# Patient Record
Sex: MALE | Race: WHITE | NOT HISPANIC OR LATINO | Employment: STUDENT | URBAN - METROPOLITAN AREA
[De-identification: names, ages, dates, MRNs, and addresses within clinical notes are randomized per-mention and may not be internally consistent; named-entity substitution may affect disease eponyms.]

---
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
## 2023-01-23 NOTE — DISCHARGE INSTRUCTIONS
[address] [facility], MD, FACP, Cite Deneen Barfield, Lashell LarsonANNEMARIE Jung, PCNP-BC   Jennifer Marinelli, [facility]-   Deysi Simpson, FNP-C  Romina Davis FNP-C   Allison Manzano, AGPCNP-BC      [address]   at [address], [address] [facility]  22.   [phone]   FAX: [address]   at [address], [address] [place] [address] - [address]   [phone]   FAX: [phone]         UPPER ENDOSCOPY PROCEDURE NOTE    Dorothy Michelet  [birth year]    INDICATION: Cirrhosis. Screening for esophageal varices with variceal ligation if  indicated. : Nila Canas MD    SURGICAL ASSISTANT:  None    PROSTHETIC DEVISES, TISSUE GRAFTS, ORGAN TRANSPLANTS:  Not applicable     ANESTHESIA/SEDATION: MAC Sedation per anesthesiology          PROCEDURE DESCRIPTION:  Inf[employer] consent was obtained from the patient for the procedure. All risks and benefits of the procedure explained. The procedure was performed in the endoscopy suite. The patient was laying on a stretcher and moved to the left lateral decubitus position prior to administration of sedation. Sedation was administered by anesthesiology. See their note for details. The endoscope was inserted into the mouth and advanced under direct vision to the second portion of the duodenum. Careful inspection of upper gastrointestinal tract was made as the endoscope was inserted and withdrawn. Retroflexion of the endoscope to view of the cardia of the stomach was performed. The findings and interventions are described below. FINDINGS:  Esophagus:    Normal.      Stomach:   No portal hypertensive gastropathy   GAVE that was not oozing  No gastric varices identified.     Duodenum:   Normal bulb and second Ice to area of soreness  Tylenol or Motrin if needed for pain  Follow up with your doctor     Contusion in [address] [address]:   A contusion is a bruise that appears on your child's skin after an injury  A bruise happens when small blood vessels tear but skin does not  When blood vessels tear, blood leaks into nearby tissue, such as soft tissue or muscle  DISCHARGE INSTRUCTIONS:   Return to the emergency department if:   · Your child cannot feel or move his or her injured arm or leg  · Your child begins to complain of pressure or a tight feeling in his or her injured muscle  · Your child suddenly has more pain when he or she moves the injured area  · Your child has severe pain in the area of the bruise  · Your child's hand or foot below the bruise gets cold or turns pale  Contact your child's healthcare provider if:   · The injured area is red and warm to the touch  · Your child's symptoms do not improve after 4 to 5 days of treatment  · You have questions or concerns about your child's condition or care  Medicines:   · NSAIDs , such as ibuprofen, help decrease swelling, pain, and fever  This medicine is available with or without a doctor's order  NSAIDs can cause stomach bleeding or kidney problems in certain people  If your child takes blood thinner medicine, always ask if NSAIDs are safe for him  Always read the medicine label and follow directions  Do not give these medicines to children under 10months of age without direction from your child's healthcare provider  · Prescription pain medicine  may be given  Do not wait until the pain is severe before you give your child more medicine  · Do not give aspirin to children under 25years of age  Your child could develop Reye syndrome if he takes aspirin  Reye syndrome can cause life-threatening brain and liver damage  Check your child's medicine labels for aspirin, salicylates, or oil of wintergreen       · Give your child's medicine as directed  Contact your child's healthcare provider if you think the medicine is not working as expected  Tell him or her if your child is allergic to any medicine  Keep a current list of the medicines, vitamins, and herbs your child takes  Include the amounts, and when, how, and why they are taken  Bring the list or the medicines in their containers to follow-up visits  Carry your child's medicine list with you in case of an emergency  Follow up with your child's healthcare provider as directed:  Write down your questions so you remember to ask them during your child's visits  Help your child's contusion heal:   · Have your child rest the injured area  or use it less than usual  If your child bruised a leg or foot, crutches may be needed to help your child walk  This will help your child keep weight off the injured body part  · Apply ice  to decrease swelling and pain  Ice may also help prevent tissue damage  Use an ice pack, or put crushed ice in a plastic bag  Cover it with a towel and place it on your child's bruise for 15 to 20 minutes every hour or as directed  · Use compression  to support the area and decrease swelling  Wrap an elastic bandage around the area over the bruised muscle  Make sure the bandage is not too tight  You should be able to fit 1 finger between the bandage and your skin  · Elevate (raise) your child's injured body part  above the level of his or her heart to help decrease pain and swelling  Use pillows, blankets, or rolled towels to elevate the area as often as you can  · Do not let your child stretch injured muscles  right after the injury  Ask your child's healthcare provider when and how your child may safely stretch after the injury  Gentle stretches can help increase your child's flexibility  · Do not massage the area or put heating pads  on the bruise right after the injury  Heat and massage may slow healing   Your child's healthcare provider portion    SPECIMENS COLLECTED:   None    INTERVENTIONS:  None    COMPLICATIONS: None. The patient tolerated the procedure well. EBL: Negligible. RECOMMENDATIONS:  Observe until discharge parameters are achieved. May resume previous diet. Repeat endoscopy to reassess varices and need for banding in 1 year. Follow-up [facility] office as scheduled.       MD Arlen Ewing [address], [address] [place] [address] Street - [address] [phone]  FAX: [address] [place] [address] may tell you to apply heat after several days  At that time, heat will start to help the injury heal   Prevent contusions:   · Do not leave your baby alone on the bed or couch  Watch him or her closely as he or she starts to crawl, learns to walk, and plays  · Make sure your child wears proper protective gear  These include padding and protective gear such as shin guards  He or she should wear these when he or she plays sports  Teach your child about safe equipment and places to play, and teach him or her to follow safety rules  · Remove or cover sharp objects in your home  As a very young child learns to walk, he or she is more likely to get injured on corners of furniture  Remove these items, or place soft pads over sharp edges and hard items in your home  © 2017 [address] Information is for End User's use only and may not be sold, redistributed or otherwise used for commercial purposes  All illustrations and images included in CareNotes® are the copyrighted property of [employer] , [employer]  or Joel Munroe  The above information is an  only  It is not intended as medical advice for individual conditions or treatments  Talk to your doctor, nurse or pharmacist before following any medical regimen to see if it is safe and effective for you

## 2023-02-27 ENCOUNTER — APPOINTMENT (OUTPATIENT)
Dept: URGENT CARE | Facility: CLINIC | Age: 18
End: 2023-02-27

## 2023-08-10 ENCOUNTER — HOSPITAL ENCOUNTER (EMERGENCY)
Facility: HOSPITAL | Age: 18
Discharge: HOME | End: 2023-08-10
Attending: EMERGENCY MEDICINE
Payer: MEDICARE

## 2023-08-10 VITALS
DIASTOLIC BLOOD PRESSURE: 79 MMHG | OXYGEN SATURATION: 97 % | HEART RATE: 66 BPM | RESPIRATION RATE: 18 BRPM | TEMPERATURE: 99.1 F | SYSTOLIC BLOOD PRESSURE: 138 MMHG

## 2023-08-10 DIAGNOSIS — B34.9 VIRAL SYNDROME: Primary | ICD-10-CM

## 2023-08-10 LAB
FLUAV RNA RESP QL NAA+PROBE: NEGATIVE
FLUBV RNA RESP QL NAA+PROBE: NEGATIVE
RSV RNA RESP QL NAA+PROBE: NEGATIVE
SARS-COV-2 RNA RESP QL NAA+PROBE: NEGATIVE

## 2023-08-10 PROCEDURE — 96372 THER/PROPH/DIAG INJ SC/IM: CPT

## 2023-08-10 PROCEDURE — 99283 EMERGENCY DEPT VISIT LOW MDM: CPT

## 2023-08-10 PROCEDURE — 0241U HB NFCT DS VIR RESP RNA 4 TRGT: CPT | Performed by: EMERGENCY MEDICINE

## 2023-08-10 PROCEDURE — 99284 EMERGENCY DEPT VISIT MOD MDM: CPT | Performed by: EMERGENCY MEDICINE

## 2023-08-10 PROCEDURE — NC001 PR NO CHARGE: Performed by: EMERGENCY MEDICINE

## 2023-08-10 RX ORDER — KETOROLAC TROMETHAMINE 30 MG/ML
15 INJECTION, SOLUTION INTRAMUSCULAR; INTRAVENOUS ONCE
Status: COMPLETED | OUTPATIENT
Start: 2023-08-10 | End: 2023-08-10

## 2023-08-10 RX ADMIN — KETOROLAC TROMETHAMINE 15 MG: 30 INJECTION, SOLUTION INTRAMUSCULAR; INTRAVENOUS at 10:48

## 2023-08-10 NOTE — ED PROVIDER NOTES
History  Chief Complaint   Patient presents with   • Flu Symptoms     Pt reports not feeling well for the past week, body aches, SOB, unable to eat, congestion, runny nose. Pt c/o of Left ankle swelling. Has had sick contacts     20 y/o male with flu like symptoms x 4 days. Patient states that he has had cough, congestion, sore throat, headache, body aches, and nausea/vomiting over the last 4 days. Patient states that he was around another person with similar symptoms. He denies any measured fevers at home. Denies any diarrhea or abdominal pain. He has tried multiple over-the-counter medications with minimal relief. No other complaints. History provided by:  Patient  Flu Symptoms  Presenting symptoms: cough, headache, myalgias, nausea, sore throat and vomiting    Presenting symptoms: no diarrhea, no fever and no shortness of breath    Severity:  Moderate  Onset quality:  Sudden  Progression:  Worsening  Chronicity:  New  Relieved by:  None tried  Worsened by:  Nothing  Ineffective treatments:  None tried  Associated symptoms: nasal congestion    Associated symptoms: no chills, no ear pain and no neck stiffness    Risk factors: sick contacts        Prior to Admission Medications   Prescriptions Last Dose Informant Patient Reported? Taking?   montelukast (SINGULAIR) 10 mg tablet  Self No No   Sig: Take 1 tablet (10 mg total) by mouth daily at bedtime      Facility-Administered Medications: None       Past Medical History:   Diagnosis Date   • Asthma     childhood       Past Surgical History:   Procedure Laterality Date   • CIRCUMCISION     • HERNIA REPAIR Right     Prp I/dl init reduc >5 yr   • TONSILLECTOMY     • TONSILLECTOMY AND ADENOIDECTOMY     • TYMPANOSTOMY TUBE PLACEMENT         Family History   Adopted: Yes   Problem Relation Age of Onset   • No Known Problems Mother    • No Known Problems Father      I have reviewed and agree with the history as documented.     E-Cigarette/Vaping   • E-Cigarette Use Never User      E-Cigarette/Vaping Substances   • Nicotine No    • CBD No    • Flavoring No    • Other No    • Unknown No      Social History     Tobacco Use   • Smoking status: Never   • Smokeless tobacco: Never   Vaping Use   • Vaping Use: Never used   Substance Use Topics   • Alcohol use: Never   • Drug use: Never       Review of Systems   Constitutional: Negative for chills and fever. HENT: Positive for congestion and sore throat. Negative for ear pain. Eyes: Negative for pain and visual disturbance. Respiratory: Positive for cough. Negative for shortness of breath and wheezing. Cardiovascular: Negative for chest pain and leg swelling. Gastrointestinal: Positive for nausea and vomiting. Negative for abdominal pain and diarrhea. Genitourinary: Negative for dysuria, frequency, hematuria and urgency. Musculoskeletal: Positive for myalgias. Negative for neck pain and neck stiffness. Skin: Negative for rash and wound. Neurological: Positive for headaches. Negative for weakness and numbness. Psychiatric/Behavioral: Negative for agitation and confusion. All other systems reviewed and are negative. Physical Exam  Physical Exam  Vitals and nursing note reviewed. Constitutional:       Appearance: He is well-developed. HENT:      Head: Normocephalic and atraumatic. Eyes:      Pupils: Pupils are equal, round, and reactive to light. Cardiovascular:      Rate and Rhythm: Normal rate and regular rhythm. Pulmonary:      Effort: Pulmonary effort is normal.      Breath sounds: Normal breath sounds. Abdominal:      General: Bowel sounds are normal.      Palpations: Abdomen is soft. Musculoskeletal:         General: Normal range of motion. Cervical back: Normal range of motion and neck supple. Skin:     General: Skin is warm and dry. Neurological:      General: No focal deficit present. Mental Status: He is alert and oriented to person, place, and time.       Comments: No focal deficits         Vital Signs  ED Triage Vitals [08/10/23 1004]   Temperature Pulse Respirations Blood Pressure SpO2   99.1 °F (37.3 °C) 86 18 (!) 174/110 97 %      Temp Source Heart Rate Source Patient Position - Orthostatic VS BP Location FiO2 (%)   Temporal Monitor Sitting Right arm --      Pain Score       --           Vitals:    08/10/23 1004   BP: (!) 174/110   Pulse: 86   Patient Position - Orthostatic VS: Sitting         Visual Acuity      ED Medications  Medications   ketorolac (TORADOL) injection 15 mg (15 mg Intramuscular Given 8/10/23 1048)       Diagnostic Studies  Results Reviewed     Procedure Component Value Units Date/Time    FLU/RSV/COVID - if FLU/RSV clinically relevant [[id number]]  (Normal) Collected: 08/10/23 1048    Lab Status: Final result Specimen: Nares from Nose Updated: 08/10/23 1136     SARS-CoV-2 Negative     INFLUENZA A PCR Negative     INFLUENZA B PCR Negative     RSV PCR Negative    Narrative:      FOR PEDIATRIC PATIENTS - copy/paste COVID Guidelines URL to browser: [email or web address]    SARS-CoV-2 assay is a Nucleic Acid Amplification assay intended for the  qualitative detection of nucleic acid from SARS-CoV-2 in nasopharyngeal  swabs. Results are for the presumptive identification of SARS-CoV-2 RNA. Positive results are indicative of infection with SARS-CoV-2, the virus  causing COVID-19, but do not rule out bacterial infection or co-infection  with other viruses. Laboratories within the [facility] and its  territories are required to report all positive results to the appropriate  public health authorities. Negative results do not preclude SARS-CoV-2  infection and should not be used as the sole basis for treatment or other  patient management decisions. Negative results must be combined with  clinical observations, patient history, and epidemiological information.   This test has not been FDA cleared or approved. This test has been authorized by FDA under an Emergency Use Authorization  (EUA). This test is only authorized for the duration of time the  declaration that circumstances exist justifying the authorization of the  emergency use of an in vitro diagnostic tests for detection of SARS-CoV-2  virus and/or diagnosis of COVID-19 infection under section 564(b)(1) of  the Act, 21 U. S.C. 453CSW-9(P)(4), unless the authorization is terminated  or revoked sooner. The test has been validated but independent review by FDA  and CLIA is pending. Test performed using [employer] GeneXpert: This RT-PCR assay targets N2,  a region unique to SARS-CoV-2. A conserved region in the E-gene was chosen  for pan-Sarbecovirus detection which includes SARS-CoV-2. According to CMS-2020-01-R, this platform meets the definition of high-throughput technology. No orders to display              Procedures  Procedures         ED Course         CRAFFT    Flowsheet Row Most Recent Value   CRAFFT Initial Screen: During the past 12 months, did you:    1. Drink any alcohol (more than a few sips)? No Filed at: 08/10/2023 1017   2. Smoke any marijuana or hashish No Filed at: 08/10/2023 1017   3. Use anything else to get high? ("anything else" includes illegal drugs, over the counter and prescription drugs, and things that you sniff or 'mckinney')? No Filed at: 08/10/2023 1017                                          Medical Decision Making  20-year-old male here for flulike symptoms-will get COVID testing and give Toradol for symptom relief. COVID test negative-patient likely with viral syndrome. Will instruct supportive care at home. Viral syndrome: acute illness or injury  Risk  Prescription drug management.           Disposition  Final diagnoses:   Viral syndrome     Time reflects when diagnosis was documented in both MDM as applicable and the Disposition within this note     Time User Action Codes Description Comment 8/10/2023 12:20 PM West ORTIZ Add [B34.9] Viral syndrome       ED Disposition     ED Disposition   Discharge    Condition   Stable    Date/Time   Thu Aug 10, 2023 10:59 AM    Comment   Huang Arana discharge to home/self care. Follow-up Information     Follow up With Specialties Details Why Contact Info Additional Information    Stacy Almazan MD Family Medicine Call in 1 day For follow-up within 2 to 3 days [address] Emergency Department Emergency Medicine Go to  Immediately for any new or worsening symptoms [address] Emergency Department, [place], [place], [address]          Patient's Medications   Discharge Prescriptions    No medications on file       No discharge procedures on file.     PDMP Review     None          ED Provider  Electronically Signed by           West Delacruz DO  08/10/23 3682

## 2024-01-09 ENCOUNTER — HOSPITAL ENCOUNTER (EMERGENCY)
Facility: HOSPITAL | Age: 19
Discharge: HOME | End: 2024-01-09
Attending: EMERGENCY MEDICINE
Payer: OTHER MISCELLANEOUS

## 2024-01-09 ENCOUNTER — APPOINTMENT (EMERGENCY)
Dept: RADIOLOGY | Facility: HOSPITAL | Age: 19
End: 2024-01-09
Payer: OTHER MISCELLANEOUS

## 2024-01-09 VITALS
HEART RATE: 84 BPM | RESPIRATION RATE: 18 BRPM | OXYGEN SATURATION: 98 % | TEMPERATURE: 97.7 F | SYSTOLIC BLOOD PRESSURE: 140 MMHG | DIASTOLIC BLOOD PRESSURE: 83 MMHG

## 2024-01-09 DIAGNOSIS — S89.91XA RIGHT KNEE INJURY, INITIAL ENCOUNTER: Primary | ICD-10-CM

## 2024-01-09 PROCEDURE — 73564 X-RAY EXAM KNEE 4 OR MORE: CPT

## 2024-01-09 PROCEDURE — 99283 EMERGENCY DEPT VISIT LOW MDM: CPT

## 2024-01-09 PROCEDURE — 99284 EMERGENCY DEPT VISIT MOD MDM: CPT | Performed by: PHYSICIAN ASSISTANT

## 2024-01-09 NOTE — ED PROVIDER NOTES
"History  Chief Complaint   Patient presents with    Knee Injury     Patient c/o right knee injury that occurred at work when he slipped and fell and landed on his right knee.  No head injury. No loc     17yo male with no significant past medical history presenting for evaluation after a right knee injury that occurred about 4 hours ago. He was at work today when he slipped and fell on his right side. There was no head injury or LOC. He started having medial right knee pain after getting up from the fall. He is able to bear weight. He denies any paresthesias. He reports being diagnosed with \"runner's knee\" in the past.       History provided by:  Patient   used: No        Prior to Admission Medications   Prescriptions Last Dose Informant Patient Reported? Taking?   montelukast (SINGULAIR) 10 mg tablet  Self No No   Sig: Take 1 tablet (10 mg total) by mouth daily at bedtime      Facility-Administered Medications: None       Past Medical History:   Diagnosis Date    Asthma     childhood       Past Surgical History:   Procedure Laterality Date    CIRCUMCISION      HERNIA REPAIR Right     Prp I/dl init reduc >5 yr    TONSILLECTOMY      TONSILLECTOMY AND ADENOIDECTOMY      TYMPANOSTOMY TUBE PLACEMENT         Family History   Adopted: Yes   Problem Relation Age of Onset    No Known Problems Mother     No Known Problems Father      I have reviewed and agree with the history as documented.    E-Cigarette/Vaping    E-Cigarette Use Never User      E-Cigarette/Vaping Substances    Nicotine No     CBD No     Flavoring No     Other No     Unknown No      Social History     Tobacco Use    Smoking status: Never    Smokeless tobacco: Never   Vaping Use    Vaping status: Never Used   Substance Use Topics    Alcohol use: Never    Drug use: Never       Review of Systems   Constitutional:  Negative for chills and fever.   HENT:  Negative for drooling and voice change.    Eyes:  Negative for discharge and redness. "   Respiratory:  Negative for shortness of breath and stridor.    Cardiovascular:  Negative for chest pain and leg swelling.   Gastrointestinal:  Negative for abdominal pain and vomiting.   Musculoskeletal:  Positive for arthralgias. Negative for joint swelling, neck pain and neck stiffness.   Skin:  Negative for color change and rash.   Neurological:  Negative for seizures and syncope.   Psychiatric/Behavioral:  Negative for confusion. The patient is not nervous/anxious.    All other systems reviewed and are negative.      Physical Exam  Physical Exam  Vitals and nursing note reviewed.   Constitutional:       General: He is not in acute distress.     Appearance: Normal appearance. He is not toxic-appearing.   HENT:      Head: Normocephalic and atraumatic.      Right Ear: External ear normal.      Left Ear: External ear normal.   Eyes:      General: No scleral icterus.        Right eye: No discharge.         Left eye: No discharge.      Conjunctiva/sclera: Conjunctivae normal.   Cardiovascular:      Rate and Rhythm: Normal rate.   Pulmonary:      Effort: Pulmonary effort is normal. No respiratory distress.      Breath sounds: No stridor.   Musculoskeletal:         General: No deformity. Normal range of motion.      Cervical back: Normal range of motion.      Right knee: No swelling, deformity, effusion, erythema, ecchymosis or lacerations. Normal range of motion. Tenderness present over the medial joint line. Normal pulse.      Right lower leg: No edema.      Left lower leg: No edema.      Comments: Right knee: Normal to inspection. No effusion noted. +Medial joint line tenderness. ROM intact. 2+ PT pulse and sensation normal.    Skin:     General: Skin is warm and dry.   Neurological:      General: No focal deficit present.      Mental Status: He is alert. Mental status is at baseline.   Psychiatric:         Mood and Affect: Mood normal.         Behavior: Behavior normal.         Vital Signs  ED Triage Vitals  "[01/09/24 0921]   Temperature Pulse Respirations Blood Pressure SpO2   97.7 °F (36.5 °C) 84 18 140/83 98 %      Temp Source Heart Rate Source Patient Position - Orthostatic VS BP Location FiO2 (%)   Temporal Monitor Sitting Left arm --      Pain Score       8           Vitals:    01/09/24 0921   BP: 140/83   Pulse: 84   Patient Position - Orthostatic VS: Sitting         Visual Acuity      ED Medications  Medications - No data to display    Diagnostic Studies  Results Reviewed       None                   XR knee 4+ vw right injury   ED Interpretation by Odilia Zimmer PA-C (01/09 1210)   No acute fracture      Final Result by Lamberto Sánchez MD (01/09 1519)      Normal right knee.            Workstation performed: [id number]                    Procedures  Procedures         ED Course         CRAFFT      Flowsheet Row Most Recent Value   CRAFFT Initial Screen: During the past 12 months, did you:    1. Drink any alcohol (more than a few sips)?  No Filed at: 01/09/2024 0923   2. Smoke any marijuana or hashish No Filed at: 01/09/2024 0923   3. Use anything else to get high? (\"anything else\" includes illegal drugs, over the counter and prescription drugs, and things that you sniff or 'mckinney')? No Filed at: 01/09/2024 0923                          Medical Decision Making  18yoM here with R knee pain after an injury. No deformity on exam and ROM is normal. RLE is neurovascularly intact. X-rays obtained which appear normal. Knee immobilizer provided. Supportive care discussed. Advised f/u with orthopedics/occupational medicine.     Problems Addressed:  Right knee injury, initial encounter: acute illness or injury    Amount and/or Complexity of Data Reviewed  Radiology: ordered and independent interpretation performed.             Disposition  Final diagnoses:   Right knee injury, initial encounter     Time reflects when diagnosis was documented in both MDM as applicable and the Disposition within this note       Time User " Action Codes Description Comment    1/9/2024 12:20 PM Odilia Zimmer Add [S89.91XA] Right knee injury, initial encounter           ED Disposition       ED Disposition   Discharge    Condition   Stable    Date/Time   Tue Jan 9, 2024 1220    Comment   Shawn Mccracken discharge to home/self care.                   Follow-up Information       Follow up With Specialties Details Why Contact Info Additional Information    [facility] Orth[facility] Schedule an appointment as soon as possible for a visit   [address] [facility] [address]  [facility] [id number] [phone] [facility] [address], [place], [place], [address] [phone]    [facility]  Schedule an appointment as soon as possible for a visit   [address] [facility] [address]  [phone]     [facility] Emergency Department Emergency Medicine  If symptoms worsen [address] [facility] [address] [phone] [facility] Emergency Department, [address] [place], [place], [address]            Discharge Medication List as of 1/9/2024 12:21 PM        CONTINUE these medications which have NOT CHANGED    Details   montelukast (SINGULAIR) 10 mg tablet Take 1 tablet (10 mg total) by mouth daily at bedtime, Starting Wed 8/12/2020, Until Tue 11/10/2020, Normal             No discharge procedures on file.    PDMP Review       None            ED Provider  Electronically Signed by             Odilia Zimmer PA-C  01/10/24 0657

## 2024-01-09 NOTE — Clinical Note
Shawn Mccracken was seen and treated in our emergency department on 1/9/2024.        No work until cleared by Family Doctor/Orthopedics        Diagnosis: Right knee injury    Shawn  .    He may return on this date:          If you have any questions or concerns, please don't hesitate to call.      Odilia Zimmer PA-C    ______________________________           _______________          _______________  Hospital Representative                              Date                                Time

## 2024-01-09 NOTE — DISCHARGE INSTRUCTIONS
Take Tylenol and ibuprofen for pain. Rest, ice, and elevate your knee. Use immobilizer for support.    Please follow-up with orthopedics or occupational medicine to clear you to return to work.

## 2024-01-09 NOTE — Clinical Note
Shawn Mccracken was seen and treated in our emergency department on 1/9/2024.        No sports until cleared by Family Doctor/Orthopedics        Diagnosis: Right knee injury    Shawn  .    He may return on this date:          If you have any questions or concerns, please don't hesitate to call.      Odilia Zimmer PA-C    ______________________________           _______________          _______________  Hospital Representative                              Date                                Time

## 2024-01-24 ENCOUNTER — OFFICE VISIT (OUTPATIENT)
Dept: OBGYN CLINIC | Facility: CLINIC | Age: 19
End: 2024-01-24

## 2024-01-24 VITALS
HEART RATE: 107 BPM | BODY MASS INDEX: 25.4 KG/M2 | SYSTOLIC BLOOD PRESSURE: 146 MMHG | HEIGHT: 70 IN | WEIGHT: 177.4 LBS | DIASTOLIC BLOOD PRESSURE: 97 MMHG

## 2024-01-24 DIAGNOSIS — M23.91 ACUTE INTERNAL DERANGEMENT OF RIGHT KNEE: Primary | ICD-10-CM

## 2024-01-24 DIAGNOSIS — M25.561 ACUTE PAIN OF RIGHT KNEE: ICD-10-CM

## 2024-01-24 PROCEDURE — 99204 OFFICE O/P NEW MOD 45 MIN: CPT | Performed by: ORTHOPAEDIC SURGERY

## 2024-01-24 RX ORDER — IBUPROFEN 200 MG
TABLET ORAL EVERY 6 HOURS PRN
COMMUNITY

## 2024-01-24 NOTE — LETTER
January 24, 2024     Patient: Sahwn Mccracken  YOB: 2005  Date of Visit: 1/24/2024      To Whom it May Concern:    Shawn Mccracken is under my professional care. Shawn was seen in my office on 1/24/2024. Shawn may not return to work at this time. He will follow up in office after MRI resulted.     If you have any questions or concerns, please don't hesitate to call.         Sincerely,          Pasquale Santiago,         CC: No Recipients

## 2024-01-24 NOTE — PROGRESS NOTES
"Patient Name:  Shawn Mccracken  MRN:  [id number]    Assessment & Plan     1. Acute internal derangement of right knee  -     MRI knee right  wo contrast; Future; Expected date: 01/24/2024  -     Crutches    2. Acute pain of right knee        Right knee internal derangement s/p work injury  X-rays reviewed in office today with patient  In light of patient's positive special testing, gapping with valgus stress, and persistent pain, will move forward with MRI of Right knee to evaluate meniscus, MCL and cartilage surfaces.   In the meantime, advised patient to continue use of the knee immobilizer with ambulation, but may remove 3 times daily for ROM of the Right knee. No pivoting, twisting, or cutting at this time.   Provided patient with crutches for gait assistance  Continue ice application  Placed work note in chart to stay out of work until MRI resulted   Follow up in office after MRI resulted     Chief Complaint     Right knee pain    History of the Present Illness     Shawn Mccracken is a 18 y.o. male with Right knee pain after slipping on ice while at work. Patient was getting out of a [employer] car and slipped. He had a knee brace on at that time from history of \"runners knee\" and was unable to see if he had swelling at that time. He did go to the ED about an hour afterwards and noticed some swelling. 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 He has not yet been at work since his injury. He continues to wear knee immobilizer. Denies locking of the Right knee. He locates most of his pain to the front of the knee, worse with stair climbing.    Review of Systems     Review of Systems   Constitutional:  Negative for chills and fever.   HENT:  Negative for ear pain and sore throat.    Eyes:  Negative for pain and visual disturbance.   Respiratory:  Negative for cough and shortness of breath.    Cardiovascular:  Negative for chest pain and palpitations.   Gastrointestinal:  Negative for abdominal pain " "and vomiting.   Genitourinary:  Negative for dysuria and hematuria.   Musculoskeletal:  Negative for arthralgias and back pain.   Skin:  Negative for color change and rash.   Neurological:  Negative for seizures and syncope.   All other systems reviewed and are negative.      Physical Exam     /97   Pulse (!) 107   Ht 5' 10\" (1.778 m)   Wt 80.5 kg (177 lb 6.4 oz)   BMI 25.45 kg/m²     Right Knee  Range of motion from 0 to 130 with pain throughout motion.    There is no crepitus with range of motion.   There is a trace effusion.    There is tenderness over the medial joint line, distal MCL and pes anserinus bursa.    There is 4+/5 quadriceps strength and preserved tone.    The patient is able to perform a straight leg raise.      Positive patellar grind test.  Guarding present with anterior and posterior drawer manuever  negative Lachman Test.   Varus stress testing reveals no pain at 0 and 30 degrees   Valgus stress testing reveals subtle increased gapping 30 degrees with associated pain  Da's testing is positive    The patient is neurovascular intact distally.      Eyes:  Anicteric sclerae.  Neck:  Supple.  Lungs:  Normal respiratory effort.  Cardiovascular:  Capillary refill is less than 2 seconds.  Skin:  Intact without erythema.  Neurologic:  Sensation grossly intact to light touch.  Psychiatric:  Mood and affect are appropriate.    Data Review     I have personally reviewed pertinent films in PACS, and my interpretation follows:    X-rays taken 01/24/2024 of Right knee independently reviewed and demonstrate well maintained joint spaces without acute fracture or dislocation noted.     Past Medical History:   Diagnosis Date    Anxiety     Asthma     childhood    Depression     Head injury        Past Surgical History:   Procedure Laterality Date    CIRCUMCISION      HERNIA REPAIR Right     Prp I/dl init reduc >5 yr    TONSILLECTOMY      TONSILLECTOMY AND ADENOIDECTOMY      TYMPANOSTOMY TUBE " PLACEMENT         Allergies   Allergen Reactions    Other Other (See Comments)     Cat is worse than dogs    eye swelling   Sneezng   Itchy ears, throat, eyes   Runny nose    Congestion   Runny nose   Itchy eyes and throat   Hives if contact with grass       Current Outpatient Medications on File Prior to Visit   Medication Sig Dispense Refill    ibuprofen (MOTRIN) 200 mg tablet Take by mouth every 6 (six) hours as needed for mild pain      [DISCONTINUED] montelukast (SINGULAIR) 10 mg tablet Take 1 tablet (10 mg total) by mouth daily at bedtime 30 tablet 1     No current facility-administered medications on file prior to visit.       Social History     Tobacco Use    Smoking status: Never    Smokeless tobacco: Never   Vaping Use    Vaping status: Every Day    Substances: Nicotine   Substance Use Topics    Alcohol use: Never    Drug use: Never       Family History   Adopted: Yes   Problem Relation Age of Onset    No Known Problems Mother     No Known Problems Father              Procedures Performed     Procedures  None       Pasquale Santiago DO

## 2024-02-01 ENCOUNTER — TELEPHONE (OUTPATIENT)
Age: 19
End: 2024-02-01

## 2024-02-01 NOTE — TELEPHONE ENCOUNTER
Caller: DENVER Siddiqui     Doctor/Office: [facility]    #: [phone]      What needs to be faxed: MRI order for right knee    ATTN to: DENVER     Fax#: [phone]

## 2024-02-08 ENCOUNTER — TELEPHONE (OUTPATIENT)
Age: 19
End: 2024-02-08

## 2024-02-08 NOTE — TELEPHONE ENCOUNTER
Caller: Destiney Sheehan W/Comp    Doctor/Office: [facility] VERDUZCO#: NA    What needs to be faxed: Last office visit notes    ATTN to: Vicky    Fax#: [phone]      Documents were successfully e-faxed  2/8/2024

## 2024-02-16 ENCOUNTER — OFFICE VISIT (OUTPATIENT)
Dept: OBGYN CLINIC | Facility: CLINIC | Age: 19
End: 2024-02-16
Payer: OTHER MISCELLANEOUS

## 2024-02-16 ENCOUNTER — TELEPHONE (OUTPATIENT)
Age: 19
End: 2024-02-16

## 2024-02-16 VITALS
SYSTOLIC BLOOD PRESSURE: 164 MMHG | HEART RATE: 80 BPM | DIASTOLIC BLOOD PRESSURE: 85 MMHG | HEIGHT: 70 IN | BODY MASS INDEX: 25.45 KG/M2

## 2024-02-16 DIAGNOSIS — S80.01XD CONTUSION OF RIGHT KNEE, SUBSEQUENT ENCOUNTER: Primary | ICD-10-CM

## 2024-02-16 PROCEDURE — 99213 OFFICE O/P EST LOW 20 MIN: CPT | Performed by: ORTHOPAEDIC SURGERY

## 2024-02-16 NOTE — TELEPHONE ENCOUNTER
Caller: Vicky RIVERO    Doctor/Office: [facility]    #: [phone]      What needs to be faxed: Work status    ATTN to: Vicky    Fax#: [phone]      Documents were successfully e-faxed

## 2024-02-16 NOTE — LETTER
February 16, 2024     Patient: Shawn Mccracken  YOB: 2005  Date of Visit: 2/16/2024      To Whom it May Concern:    Shawn Mccracken is under my professional care. Shawn was seen in my office on 2/16/2024. Shawn may return to work on 02/19/2024 without restrictions.    If you have any questions or concerns, please don't hesitate to call.         Sincerely,          Pasquale Santiago,         CC: No Recipients

## 2024-02-16 NOTE — PROGRESS NOTES
"Patient Name:  Shawn Mccracken  MRN:  [id number]    Assessment & Plan     1. Contusion of right knee, subsequent encounter        Right knee contusion  MRI reviewed in office with patient and friend present during exam  At this time, patient may discontinue brace and crutches and perform ROM and strengthening exercises with outpatient PT. Script placed today.  Work note placed to return on Monday without restrictions  Follow up as needed       History of the Present Illness   Shawn Mccracken is a 18 y.o. male with Right knee internal derangement. Today, patient reports he has some residual discomfort in the knee. He has continued the knee immobilizer and crutches for ambulation when out of the house. He locates some discomfort to the front of the knee with attempted ROM.         Review of Systems     Review of Systems    Physical Exam     /85   Pulse 80   Ht 5' 10\" (1.778 m)   BMI 25.45 kg/m²     Right Knee  Range of motion from 0 to 130 degrees with mild discomfort at terminal flexion.    There is no effusion.    There is no tenderness over the medial or lateral joint lines.    The patient is able to perform a straight leg raise with 5/5 quad strength.    Varus stress testing reveals no pain or instability at 0 and 30 degrees   Valgus stress testing reveals no pain or instability at 0 and 30 degrees  Negative Da test  The patient is neurovascular intact distally.      Data Review     I have personally reviewed pertinent films in PACS, and my interpretation follows.    MRI performed 02/08/2024 of Right knee demonstrates no acute meniscal or ligamentous pathology. Mild edema within the lateral aspect of Hoffas fat pad.    X-rays taken 01/24/2024 of Right knee independently reviewed and demonstrate well maintained joint spaces without acute fracture or dislocation noted.      Social History     Tobacco Use    Smoking status: Never    Smokeless tobacco: Never   Vaping Use    Vaping status: Every Day "    Substances: Nicotine   Substance Use Topics    Alcohol use: Never    Drug use: Never           Procedures  None     Alexia Ordaz PA-C